# Patient Record
Sex: FEMALE | Race: OTHER
[De-identification: names, ages, dates, MRNs, and addresses within clinical notes are randomized per-mention and may not be internally consistent; named-entity substitution may affect disease eponyms.]

---

## 2018-09-29 ENCOUNTER — HOSPITAL ENCOUNTER (EMERGENCY)
Dept: HOSPITAL 36 - ER | Age: 28
LOS: 1 days | Discharge: HOME | End: 2018-09-30
Payer: MEDICAID

## 2018-09-29 DIAGNOSIS — K52.9: Primary | ICD-10-CM

## 2018-09-29 DIAGNOSIS — Z98.890: ICD-10-CM

## 2018-09-29 DIAGNOSIS — R31.9: ICD-10-CM

## 2018-09-29 DIAGNOSIS — Z88.0: ICD-10-CM

## 2018-09-29 LAB
ALBUMIN SERPL-MCNC: 4.2 GM/DL (ref 3.7–5.3)
ALBUMIN/GLOB SERPL: 1.5 {RATIO} (ref 1–1.8)
ALP SERPL-CCNC: 68 U/L (ref 34–104)
ALT SERPL-CCNC: 13 U/L (ref 7–52)
ANION GAP SERPL CALC-SCNC: 11.4 MMOL/L (ref 7–16)
APPEARANCE UR: CLEAR
AST SERPL-CCNC: 13 U/L (ref 13–39)
BACTERIA #/AREA URNS HPF: (no result) /HPF
BASOPHILS # BLD AUTO: 0 TH/CUMM (ref 0–0.2)
BASOPHILS NFR BLD AUTO: 0.4 % (ref 0–2)
BILIRUB SERPL-MCNC: 0.3 MG/DL (ref 0.3–1)
BILIRUB UR-MCNC: NEGATIVE MG/DL
BUN SERPL-MCNC: 9 MG/DL (ref 7–25)
CALCIUM SERPL-MCNC: 9.5 MG/DL (ref 8.6–10.3)
CHLORIDE SERPL-SCNC: 104 MEQ/L (ref 98–107)
CO2 SERPL-SCNC: 26.3 MEQ/L (ref 21–31)
COLOR UR: YELLOW
CREAT SERPL-MCNC: 0.6 MG/DL (ref 0.6–1.2)
EOSINOPHIL # BLD AUTO: 0.7 TH/CMM (ref 0.1–0.4)
EOSINOPHIL NFR BLD AUTO: 5.8 % (ref 0–5)
EPI CELLS URNS QL MICRO: (no result) /LPF
ERYTHROCYTE [DISTWIDTH] IN BLOOD BY AUTOMATED COUNT: 13.8 % (ref 11.5–20)
GLOBULIN SER-MCNC: 2.8 GM/DL
GLUCOSE SERPL-MCNC: 126 MG/DL (ref 70–105)
GLUCOSE UR STRIP-MCNC: NEGATIVE MG/DL
HCT VFR BLD CALC: 40.5 % (ref 41–60)
HGB BLD-MCNC: 13.3 GM/DL (ref 12–16)
KETONES UR STRIP-MCNC: NEGATIVE MG/DL
LEUKOCYTE ESTERASE UR-ACNC: NEGATIVE
LYMPHOCYTE AB SER FC-ACNC: 2.7 TH/CMM (ref 1.5–3)
LYMPHOCYTES NFR BLD AUTO: 21.9 % (ref 20–50)
MCH RBC QN AUTO: 28.1 PG (ref 27–31)
MCHC RBC AUTO-ENTMCNC: 32.8 PG (ref 28–36)
MCV RBC AUTO: 85.7 FL (ref 81–100)
MICRO URNS: YES
MONOCYTES # BLD AUTO: 0.2 TH/CMM (ref 0.3–1)
MONOCYTES NFR BLD AUTO: 1.9 % (ref 2–10)
NEUTROPHILS # BLD: 8.8 TH/CMM (ref 1.8–8)
NEUTROPHILS NFR BLD AUTO: 70 % (ref 40–80)
NITRITE UR QL STRIP: NEGATIVE
PH UR STRIP: 6 [PH] (ref 4.6–8)
PLATELET # BLD: 482 TH/CMM (ref 150–400)
PMV BLD AUTO: 7.1 FL
POTASSIUM SERPL-SCNC: 3.7 MEQ/L (ref 3.5–5.1)
PROT UR STRIP-MCNC: NEGATIVE MG/DL
RBC # BLD AUTO: 4.73 MIL/CMM (ref 3.8–5.1)
RBC # UR STRIP: (no result) /UL
RBC #/AREA URNS HPF: (no result) /HPF (ref 0–5)
SODIUM SERPL-SCNC: 138 MEQ/L (ref 136–145)
SP GR UR STRIP: 1.02 (ref 1–1.03)
URINALYSIS COMPLETE PNL UR: (no result)
UROBILINOGEN UR STRIP-ACNC: 0.2 E.U./DL (ref 0.2–1)
WBC # BLD AUTO: 12.4 TH/CMM (ref 4.8–10.8)
WBC #/AREA URNS HPF: (no result) /HPF (ref 0–5)

## 2018-09-29 PROCEDURE — 36415 COLL VENOUS BLD VENIPUNCTURE: CPT

## 2018-09-29 PROCEDURE — 84443 ASSAY THYROID STIM HORMONE: CPT

## 2018-09-29 PROCEDURE — 81001 URINALYSIS AUTO W/SCOPE: CPT

## 2018-09-29 PROCEDURE — 87086 URINE CULTURE/COLONY COUNT: CPT

## 2018-09-29 PROCEDURE — 85025 COMPLETE CBC W/AUTO DIFF WBC: CPT

## 2018-09-29 PROCEDURE — 74177 CT ABD & PELVIS W/CONTRAST: CPT

## 2018-09-29 PROCEDURE — 96365 THER/PROPH/DIAG IV INF INIT: CPT

## 2018-09-29 PROCEDURE — 80053 COMPREHEN METABOLIC PANEL: CPT

## 2018-09-29 PROCEDURE — 81025 URINE PREGNANCY TEST: CPT

## 2018-09-29 PROCEDURE — 99285 EMERGENCY DEPT VISIT HI MDM: CPT

## 2018-09-29 NOTE — ED PHYSICIAN CHART
ED Chief Complaint/HPI





- Patient Information


Date Seen:: 18


Time Seen:: 21:45


Chief Complaint:: abdominal pain


Allergies:: 


 Allergies











Allergy/AdvReac Type Severity Reaction Status Date / Time


 


Penicillins [PCN] Allergy   Verified 18 22:03











Vitals:: 


 Vital Signs - 8 hr











  18





  21:40


 


Temp 98.9 F


 


HR 80


 


RR 18


 


/76


 


O2 Sat % 100











Historian:: Patient


Review:: Nurse's Note Reviewed, Old Chart Reviewed





ED Review of Systems





- Review of Systems


General/Constitutional: No fever, No chills, No weight loss, No weakness, No 

diaphoresis, No edema, No loss of appetite


Skin: No skin lesions, No rash, No bruising


Head: No headache, No light-headedness


Eyes: No loss of vision, No pain, No diplopia


ENT: No earache, No nasal drainage, No sore throat, No tinnitus


Neck: No neck pain, No swelling, No thyromegaly, No stiffness, No mass noted


Cardio Vascular: No chest pain, No palpitations, No PND, No orthopnea, No edema


Pulmonary: No SOB, No cough, No sputum, No wheezing


GI: Nausea, Vomiting, No diarrhea, Pain, No melena, No hematochezia, No 

constipation, No hematemesis


G/U: No dysuria, No frequency, No hematuria


Musculoskeletal: No bone or joint pain, No back pain, No muscle pain


Endocrine: No polyuria, No polydipsia


Psychiatric: No prior psych history, No depression, No anxiety, No suicidal 

ideation


Hematopoietic: No bruising, No lymphadenopathy


Allergic/Immuno: No urticaria, No angioedema


Neurological: No syncope, No focal symptoms, No weakness, No paresthesia, No 

headache, No seizure, No dizziness, No confusion, No vertigo





ED Past Medical History





- Past Medical History


Obtainable: Yes


Past Medical History: No significant medical hx


Family History: None


Social History: Non Smoker, No Alcohol, No Drug Use


Surgical History:  (two c-sections)


Psychiatricy History: None


Medication: Reviewed





Family Medical History





- Family Member


  ** Mother


History Unknown: Yes


Hx Family Diabetes: Yes





ED Physical Exam





- Physical Examination


General/Constitutional: Awake, Well-developed, well-nourished, Alert, No 

distress, GCS 15, Non-toxic appearing, Ambulatory


Head: Atraumatic


Eyes: Lids, conjuctiva normal, PERRL, EOMI


Skin: Nl inspection, No rash, No skin lesions, No ecchymosis, Well hydrated, No 

lymphadenopathy


ENMT: External ears, nose nl, Nasal exam nl, Lips, teeth, gums nl


Neck: Nontender, Full ROM w/o pain, No JVD, No nuchal rigidity, No bruit, No 

mass, No stridor


Respiratory: Nl effort/Exclusion, Clear to Auscultation, No Wheeze/Rhonchi/Rales


Cardio Vascular: RRR, No murmur, gallop, rubs, NL S1 S2


GI: No tenderness/rebounding/guarding (minimal tenderness of the lower abdomen)

, No organomegaly, No hernia, Normal BS's, Nondistended, No mass/bruits, No 

[place] tenderness


: No CVA tenderness


Extremities: No tenderness or effusion, Full ROM, normal strength in all 

extremities, No edema, Normal digits & nails


Neuro/Psych: Alert/oriented, DTR's symmetric, Normal sensory exam, Normal motor 

strength, Judgement/insight normal, Mood normal, Normal gait, No focal deficits


Misc: Normal back, No paraspinal tenderness





ED Labs/Radiology/EKG Results





- Lab Results


Results: 


 Laboratory Tests











  18





  21:42


 


POC Ur Pregnancy Test  Negative








 Abnormal Lab Results











  18





  21:42 22:04 22:04


 


WBC   12.4 H 


 


RBC   4.73 


 


Hgb   13.3 


 


Hct   40.5 L 


 


MCV   85.7 


 


MCH   28.1 


 


MCHC Differential   32.8 


 


RDW   13.8 


 


Plt Count   482 H 


 


MPV   7.1 


 


Neutrophils %   70.0 


 


Lymphocytes %   21.9 


 


Monocytes %   1.9 L 


 


Eosinophils %   5.8 H 


 


Basophils %   0.4 


 


Sodium    138


 


Potassium    3.7


 


Chloride    104


 


Carbon Dioxide    26.3


 


Anion Gap    11.4


 


BUN    9


 


Creatinine    0.6


 


Est GFR ( Amer)    > 60.0


 


Est GFR (Non-Af Amer)    > 60.0


 


BUN/Creatinine Ratio    15.0


 


Glucose    126 H


 


Calcium    9.5


 


Total Bilirubin    0.3


 


AST    13


 


ALT    13


 


Alkaline Phosphatase    68


 


Total Protein    7.0


 


Albumin    4.2


 


Globulin    2.8


 


Albumin/Globulin Ratio    1.5


 


Urine Source   


 


Urine Color   


 


Urine Clarity   


 


Urine pH   


 


Ur Specific Gravity   


 


Urine Protein   


 


Urine Glucose (UA)   


 


Urine Ketones   


 


Urine Blood   


 


Urine Nitrate   


 


Urine Bilirubin   


 


Urine Urobilinogen   


 


Ur Leukocyte Esterase   


 


Urine RBC   


 


Urine WBC   


 


Ur Epithelial Cells   


 


Urine Bacteria   


 


POC Ur Pregnancy Test  Negative  














  18





  22:09


 


WBC 


 


RBC 


 


Hgb 


 


Hct 


 


MCV 


 


MCH 


 


MCHC Differential 


 


RDW 


 


Plt Count 


 


MPV 


 


Neutrophils % 


 


Lymphocytes % 


 


Monocytes % 


 


Eosinophils % 


 


Basophils % 


 


Sodium 


 


Potassium 


 


Chloride 


 


Carbon Dioxide 


 


Anion Gap 


 


BUN 


 


Creatinine 


 


Est GFR ( Amer) 


 


Est GFR (Non-Af Amer) 


 


BUN/Creatinine Ratio 


 


Glucose 


 


Calcium 


 


Total Bilirubin 


 


AST 


 


ALT 


 


Alkaline Phosphatase 


 


Total Protein 


 


Albumin 


 


Globulin 


 


Albumin/Globulin Ratio 


 


Urine Source  CLEAN C


 


Urine Color  YELLOW


 


Urine Clarity  CLEAR


 


Urine pH  6.0


 


Ur Specific Gravity  1.025


 


Urine Protein  NEGATIVE


 


Urine Glucose (UA)  NEGATIVE


 


Urine Ketones  NEGATIVE


 


Urine Blood  MODERATE H


 


Urine Nitrate  NEGATIVE


 


Urine Bilirubin  NEGATIVE


 


Urine Urobilinogen  0.2


 


Ur Leukocyte Esterase  NEGATIVE


 


Urine RBC  2-5


 


Urine WBC  0-2


 


Ur Epithelial Cells  FEW


 


Urine Bacteria  FEW


 


POC Ur Pregnancy Test 














- Radiology Results


Results: 





ct scan of the abdomen and pelvis  = nad





ED Assessment





- Assessment


General Assessment: 





abdominal pain


hematuria





ED Septic Shock





- .


Is Septic Shock (SBP<90, OR Lactate>4 mmol\L) present?: No





- <6hrs of presentation:


Vital Signs: 


 Vital Signs - 8 hr











  18





  21:40


 


Temp 98.9 F


 


HR 80


 


RR 18


 


/76


 


O2 Sat % 100














ED Reassessment (Disposition)





- Diagnosis


Diagnosis:: 





gastroenteritis


hematuria





- Aftercare/Follow up Instructions


Aftercare/Follow-Up Instructions:: Counseled pt regarding lab results/diagnosis 

& need follow up, Refer to Discharge Instructions, Counseled pt & family 

regarding lab results/diagnosis & need follow up





- Patient Disposition


Discharge/Transfer:: Home


Condition at Disposition:: Improved

## 2018-09-30 NOTE — DIAGNOSTIC IMAGING REPORT
CT abdomen and pelvis with intravenous contrast



Indication: Abdominal pain, vomiting



Comparison: None,



Technique: Axial images were obtained from the lung bases to the

bilateral proximal femurs with IV contrast.  Coronal reconstructions

were made.  total DLP: 1013,  [id number]



FINDINGS:



Hypoventilatory atelectatic changes of the lung bases are noted.  No

evidence of focal hepatic, splenic, or pancreatic lesions.  Small hiatal

hernia is noted.  No focal adrenal lesions.  No evidence of

hydronephrosis or focal renal lesions.  Liquid stool is seen.  No

evidence of free air or free fluid.  No appendicitis.  The osseous

structures demonstrate no acute abnormalities.



IMPRESSION:



No evidence of bowel obstruction



Liquid stool which may be due to underlying inflammatory

process/enteritis



No evidence of free fluid.

## 2019-02-20 ENCOUNTER — HOSPITAL ENCOUNTER (EMERGENCY)
Dept: HOSPITAL 36 - ER | Age: 29
Discharge: HOME | End: 2019-02-20
Payer: MEDICAID

## 2019-02-20 DIAGNOSIS — J32.9: ICD-10-CM

## 2019-02-20 DIAGNOSIS — Z88.0: ICD-10-CM

## 2019-02-20 DIAGNOSIS — J40: Primary | ICD-10-CM

## 2019-02-20 DIAGNOSIS — J06.9: ICD-10-CM

## 2019-02-20 PROCEDURE — Z7502: HCPCS

## 2019-02-28 NOTE — ER PHYSICIAN DOCUMENTATION
DATE OF SERVICE:  02/20/2019



HISTORY OF PRESENT ILLNESS:  This patient presents to the Emergency Room with

cough, fever and congestion for 3 days.  The patient denies trauma, loss of

consciousness, altered level of consciousness, headaches, sore throat, neck

pain, chest pain, shortness of breath, abdominal pain, anorexia, nausea,

vomiting, diarrhea or constipation.  The patient is eating and urinating well. 

The patient has urinated about an hour prior to admission.



PAST MEDICAL HISTORY:  None.



MEDICATIONS:  None.



ALLERGIES:  PENICILLIN.



FAMILY HISTORY:  Not known.



SOCIAL HISTORY:  The patient denies smoking, alcohol or drug use.



REVIEW OF SYSTEMS:  Otherwise, noncontributory.



PHYSICAL EXAMINATION:

GENERAL:  The patient to be in no acute distress, afebrile.

VITAL SIGNS:  Stable.

HEENT:  Negative.  There was positive nasal congestion.

NECK:  Supple.  No cervical tenderness.  No meningeal signs.

CARDIOVASCULAR:  Regular rate and rhythm.

LUNGS:  Clear with good breath sounds bilaterally.

ABDOMEN:  Soft, nontender, normoactive bowel sounds.  No pulsatile masses.

EXTREMITIES:  No edema, clubbing or cyanosis.

NEUROLOGIC:  Shows no focal signs.

SKIN:  Shows good turgor with moist mucous membranes.



MEDICAL DECISION MAKING:  The patient improved in the Emergency Room course and

was asymptomatic and comfortable upon discharge.  The patient thus was

discharged with a prescription for azithromycin, otherwise known as Z-FELIPE.  The

patient is to take 2 tablets the first day, then one tablet once a day for the

next 4 days.  A total of 6 pills were prescribed, one Z-FELIPE.  Tylenol 500 mg 4

times a day p.r.n. fever.  Cool mist vaporizer for the congestion.  The patient

is to return to the emergency room as needed if existing exception should

reoccur and/or get worse and/or any new other symptoms should occur.  Aftercare

instructions have been given for all diagnosis.  The patient is to have followup

care with primary physician in one day or as needed.  Refer to the internist as

soon as possible.  Otherwise, follow up care with primary physician in one day

or as needed 



DIAGNOSES:  Bronchitis, cough, congestion, sinusitis, fever and upper

respiratory infection.





DD: 02/28/2019 10:24

DT: 02/28/2019 10:41

[facility]# [id number]  [id number]